# Patient Record
Sex: MALE | Race: AMERICAN INDIAN OR ALASKA NATIVE | NOT HISPANIC OR LATINO | Employment: FULL TIME | URBAN - METROPOLITAN AREA
[De-identification: names, ages, dates, MRNs, and addresses within clinical notes are randomized per-mention and may not be internally consistent; named-entity substitution may affect disease eponyms.]

---

## 2018-07-19 ENCOUNTER — HOSPITAL ENCOUNTER (EMERGENCY)
Facility: HOSPITAL | Age: 48
Discharge: HOME | End: 2018-07-20
Attending: EMERGENCY MEDICINE
Payer: MEDICAID
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
## 2018-07-19 DIAGNOSIS — S80.01XA CONTUSION OF RIGHT KNEE, INITIAL ENCOUNTER: Primary | ICD-10-CM

## 2018-07-19 PROCEDURE — 99283 EMERGENCY DEPT VISIT LOW MDM: CPT

## 2018-07-20 VITALS
BODY MASS INDEX: 39.4 KG/M2 | WEIGHT: 260 LBS | HEART RATE: 66 BPM | TEMPERATURE: 98 F | SYSTOLIC BLOOD PRESSURE: 147 MMHG | OXYGEN SATURATION: 97 % | RESPIRATION RATE: 18 BRPM | DIASTOLIC BLOOD PRESSURE: 83 MMHG | HEIGHT: 68 IN

## 2018-07-20 RX ORDER — NAPROXEN SODIUM 550 MG/1
550 TABLET ORAL 2 TIMES DAILY WITH MEALS
Qty: 14 TABLET | Refills: 0 | Status: SHIPPED | OUTPATIENT
Start: 2018-07-20 | End: 2018-07-27

## 2018-07-20 NOTE — ED TRIAGE NOTES
Pt. To the ER with c/o right knee pain after a fall in a store earlier today. No gross deformities noted. Bed in the low position and call light at the bedside. Pt. Had a right knee replacement in August 2017.

## 2018-07-20 NOTE — ED PROVIDER NOTES
Encounter Date: 7/19/2018  SCRIBE #1 NOTE: I, Suresh Cintron, am scribing for, and in the presence of,  Derrek Garcia MD. I have scribed the entire note.        History     Chief Complaint   Patient presents with    Leg Swelling     reports slipped and fell due to wet surface around 1930. Reports swelling and pain to right knee. Pt denies taking any medication for pain.      Time patient was seen by the provider: 11:44 PM    The patient is a 48 y.o. male with co-morbidities including: HTN who presents to the ED with a complaint of right knee pain. He reports a slip and fall just PTA in the grocery store. Since that time it has been painful and swollen. He did not use ice or take medicine for the pain PTA. He reports he underwent R knee replacement last year at [facility]. He has not had any issues since. The patient denies any weakness in the leg or altered sensation. He has been ambulatory after the fall. He denies any associated injuries, syncope, head impact, neck/back pain.            Review of patient's allergies indicates:  No Known Allergies  Past Medical History:   Diagnosis Date    Hypertension      Past Surgical History:   Procedure Laterality Date    KNEE SURGERY Right      History reviewed. No pertinent family history.  Social History   Substance Use Topics    Smoking status: Current Every Day Smoker     Packs/day: 0.50    Smokeless tobacco: Never Used    Alcohol use No     Review of Systems   Constitutional: Negative for chills and fever.   HENT: Negative for sore throat.    Respiratory: Negative for shortness of breath.    Cardiovascular: Negative for chest pain.   Gastrointestinal: Negative for constipation, diarrhea, nausea and vomiting.   Genitourinary: Negative for dysuria, frequency and urgency.   Musculoskeletal: Positive for arthralgias and joint swelling. Negative for back pain, myalgias, neck pain and neck stiffness.        Right knee   Skin: Negative for rash and wound.   Neurological: Negative for  weakness.   Hematological: Does not bruise/bleed easily.   Psychiatric/Behavioral: Negative for agitation, behavioral problems and confusion.       Physical Exam     Initial Vitals [07/19/18 2228]   BP Pulse Resp Temp SpO2   (!) 164/95 77 18 98.1 °F (36.7 °C) 97 %      MAP       --         Physical Exam    Nursing note and vitals reviewed.  Constitutional: He appears well-developed and well-nourished. He is not diaphoretic. No distress.   HENT:   Head: Normocephalic and atraumatic.   Mouth/Throat: Oropharynx is clear and moist.   Eyes: EOM are normal. Pupils are equal, round, and reactive to light.   Neck: No tracheal deviation present.   Cardiovascular: Normal rate, regular rhythm, normal heart sounds and intact distal pulses.   Pulmonary/Chest: Breath sounds normal. No stridor. No respiratory distress.   Abdominal: Soft. He exhibits no distension and no mass. There is no tenderness.   Musculoskeletal: Normal range of motion. He exhibits no edema.        Right knee: He exhibits swelling and bony tenderness. He exhibits normal range of motion, no effusion, no ecchymosis, no deformity, no laceration, no erythema and normal alignment. Tenderness found. Lateral joint line tenderness noted.   Neurological: He is alert and oriented to person, place, and time. No cranial nerve deficit or sensory deficit.   Skin: Skin is warm and dry. Capillary refill takes less than 2 seconds. No rash noted.   Psychiatric: He has a normal mood and affect. His behavior is normal. Thought content normal.         ED Course   Procedures  Labs Reviewed - No data to display       Imaging Results          X-Ray Knee 3 View Right (Final result)  Result time 07/20/18 01:03:43    Final result by Rachid Arce MD (07/20/18 01:03:43)                 Impression:      No acute fracture.    Prior right knee replacement.    Minimal suprapatellar effusion.      Electronically signed by: Rachid Arce MD  Date:    07/20/2018  Time:    01:03              Narrative:    EXAMINATION:  XR KNEE 3 VIEW RIGHT    CLINICAL HISTORY:  Unspecified fall, initial encounter    TECHNIQUE:  AP, lateral, and oblique views of the right knee were performed.    COMPARISON:  None    FINDINGS:  No fracture or dislocation.  Minimal suprapatellar joint effusion.  Prior right total knee replacement.                                 Medical Decision Making:   Initial Assessment:   47 Y/O male with a Hx of right knee replacement presents to ED after slip and fall, reports swelling and pain to lateral knee. Will obtain x-ray and reassess.  Differential Diagnosis:   Differential Diagnosis includes, but is not limited to:  Fracture, dislocation, compartment syndrome, nerve injury/palsy, vascular injury, rhabdomyolysis, hemarthrosis, septic joint, bursitis, muscle strain, ligament tear/sprain, laceration with foreign body, abrasion, soft tissue contusion, osteoarthritis.    Clinical Tests:   Radiological Study: Ordered and Reviewed  ED Management:  X-ray is negative for fracture/dislocation.   He was counseled on RICE instructions and further symptomatic and supportive care.   He was encouraged to follow with PCP or his orthopedic surgeon as needed if symptoms persist.  Upon re-evaluation, the patient's status has remained stable.  After complete ED evaluation, clinical impression is most consistent with knee contusion.  At this time, I feel there is no emergent condition requiring further evaluation or admission. I believe the patient is stable for discharge from the ED. The patient and any additional family present were updated with test results, overall clinical impression, and recommended further plan of care. All questions were answered. The patient expressed understanding and agreed with current plan for discharge with PCP follow-up within 1 week. Strict return precautions were provided, including return/worsening of current symptoms or any other concerns.                         Clinical  Impression:   The primary encounter diagnosis was Contusion of right knee, initial encounter. A diagnosis of Fall was also pertinent to this visit.      Disposition:   Disposition: Discharged  Condition: Stable         I, Dr. Derrek Garcia, personally performed the services described in this documentation. All medical record entries made by the scribe were at my direction and in my presence.  I have reviewed the chart and agree that the record reflects my personal performance and is accurate and complete.     Derrek Garcia MD.                   Derrek Garcia MD  07/24/18 8951